# Patient Record
Sex: FEMALE | Race: WHITE | NOT HISPANIC OR LATINO
[De-identification: names, ages, dates, MRNs, and addresses within clinical notes are randomized per-mention and may not be internally consistent; named-entity substitution may affect disease eponyms.]

---

## 2023-02-22 ENCOUNTER — NON-APPOINTMENT (OUTPATIENT)
Age: 58
End: 2023-02-22

## 2023-02-22 PROBLEM — Z00.00 ENCOUNTER FOR PREVENTIVE HEALTH EXAMINATION: Status: ACTIVE | Noted: 2023-02-22

## 2023-02-23 ENCOUNTER — APPOINTMENT (OUTPATIENT)
Dept: GASTROENTEROLOGY | Facility: CLINIC | Age: 58
End: 2023-02-23
Payer: COMMERCIAL

## 2023-02-23 VITALS
WEIGHT: 271 LBS | DIASTOLIC BLOOD PRESSURE: 90 MMHG | BODY MASS INDEX: 43.55 KG/M2 | HEIGHT: 66 IN | OXYGEN SATURATION: 97 % | SYSTOLIC BLOOD PRESSURE: 140 MMHG | HEART RATE: 108 BPM

## 2023-02-23 DIAGNOSIS — R13.19 OTHER DYSPHAGIA: ICD-10-CM

## 2023-02-23 PROCEDURE — 99244 OFF/OP CNSLTJ NEW/EST MOD 40: CPT

## 2023-02-23 NOTE — ASSESSMENT
[FreeTextEntry1] : Will plan on an upper endoscopy for dysphagia, possible esophageal stricture vs. Schatzki's ring. Will likely need dilation.  Explained risks/benefits/alternatives including not limited to bleeding, infection, perforation, missed lesions, anesthesia complications.  Patient understands and agrees, all questions answered.\par \par Will start on omeprazole 20mg daily until her procedure.\par \par Thank you for referring Ms. CARABALLO.  Please do not hesitate to call to further discuss his/her care.\par \par Note faxed to requesting MD.\par \par

## 2023-02-23 NOTE — HISTORY OF PRESENT ILLNESS
[FreeTextEntry1] : Ms. Alamo is a pleasant 57F h/o cholecystectomy, cardiac ablation 2019 (ventricular tachycardia), RONY, obesity, depression who is referred by Dr. Gonzalez for dysphagia.\par \par She had a chronic cough that has been worked up by pulmonary, no pulmonary cause, although she does have RONY.\par \shawn Was seen by Dr. 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  Had a TNE performed on 2/21, reviewed pictures from her procedure showing a likely Schatzki's ring vs. distal esophageal stricture, possible esophagitis, unclear from images.\par \par She has had dysphagia frequently, she has had multiple episodes of getting food stuck and needed to regurgitate to dislodge the food, most recently with salad approximately 1 month ago.\par \par No significant weight change.\par marla Underwent a colonoscopy in 2021, no polyps. \par \shawn Underwent an EGD in the distant past, possibly 5-6 years ago.  Was told she had reflux.\par \shawn Does not smoke or drink.\par \par Father had biliary cancer.

## 2023-07-04 ENCOUNTER — RESULT REVIEW (OUTPATIENT)
Age: 58
End: 2023-07-04

## 2023-07-05 ENCOUNTER — APPOINTMENT (OUTPATIENT)
Dept: GASTROENTEROLOGY | Facility: HOSPITAL | Age: 58
End: 2023-07-05

## 2023-07-05 DIAGNOSIS — K20.90 ESOPHAGITIS, UNSPECIFIED WITHOUT BLEEDING: ICD-10-CM

## 2023-07-23 ENCOUNTER — TRANSCRIPTION ENCOUNTER (OUTPATIENT)
Age: 58
End: 2023-07-23

## 2023-07-25 ENCOUNTER — RESULT REVIEW (OUTPATIENT)
Age: 58
End: 2023-07-25

## 2023-07-31 ENCOUNTER — APPOINTMENT (OUTPATIENT)
Dept: GASTROENTEROLOGY | Facility: CLINIC | Age: 58
End: 2023-07-31
Payer: COMMERCIAL

## 2023-07-31 VITALS
HEIGHT: 66 IN | DIASTOLIC BLOOD PRESSURE: 70 MMHG | BODY MASS INDEX: 43.55 KG/M2 | SYSTOLIC BLOOD PRESSURE: 110 MMHG | WEIGHT: 271 LBS

## 2023-07-31 DIAGNOSIS — K31.84 GASTROPARESIS: ICD-10-CM

## 2023-07-31 PROCEDURE — 99214 OFFICE O/P EST MOD 30 MIN: CPT

## 2023-07-31 RX ORDER — METOCLOPRAMIDE 5 MG/1
5 TABLET ORAL
Qty: 5 | Refills: 3 | Status: ACTIVE | COMMUNITY
Start: 2023-07-31 | End: 1900-01-01

## 2023-08-01 NOTE — ASSESSMENT
[FreeTextEntry1] : Discussed the diagnosis of gastroparesis at length.  Questions answered.  Will start a low fat, low fiber diet.  Will continue on her daily omeprazole.  Will use a trial of reglan 5mg daily for 5 days to relieve her symptoms, will then use sparingly PRN. Discussed at length the potential side effects including not limited to neurological side effects such as tremors, dystonic reactions, black box warnings. She understands and agrees.  Will f/u in 3 months, at that time based on how she is doing, would consider a repeat EGD.  Discussed the need for weight loss.

## 2023-08-01 NOTE — HISTORY OF PRESENT ILLNESS
[FreeTextEntry1] : Ms. Alamo is a pleasant 58F h/o cholecystectomy, cardiac ablation 2019 (v-tach), RONY, obesity, depression who returns for f/u.  Underwent an EGD with myself on 7/5/23, procedure aborted due to food in the stomach, no gross abnormalities seen.  Gastric emptying study on 7/25/23 showing marked gastroparesis.  Of note, she states she has been taking Saxenda for 1 month due to pre-DM, states has been "borderline" for DM for quite a while.  Her coughing symptoms and regurgitation have been present since at least 10/22.  She comes today to discuss her new diagnosis of gastroparesis.  She does report frequent regurgitation.  She will often feel hungry, however now thinks she is misinterpreting the sensation of acid as hunger, as when she eats during these "hunger" episodes she will often quickly regurgitate undigested food.  Has been taking omeprazole daily as prescribed after her recent EGD.  Colonoscopy 2021, no polyps as per her report.  Does not smoke or drink.  Father had biliary cancer.

## 2024-02-12 ENCOUNTER — RX RENEWAL (OUTPATIENT)
Age: 59
End: 2024-02-12

## 2024-02-12 RX ORDER — OMEPRAZOLE 20 MG/1
20 CAPSULE, DELAYED RELEASE ORAL
Qty: 90 | Refills: 1 | Status: ACTIVE | COMMUNITY
Start: 2023-07-05 | End: 1900-01-01